# Patient Record
(demographics unavailable — no encounter records)

---

## 2025-05-08 NOTE — HISTORY OF PRESENT ILLNESS
[de-identified] : 34-year-old male presents for an initial evaluation c/o: ear infection Reports he came in because he thinks he has an ear infection in the right ear.  States he has no pain but has ringing, buzzing and static sounds. States it started Monday.  Complains of occasional ear itchiness.  Mentions he was sick late march, ears get clogged on and off since. Realized his ear got clogged last week causing trouble hearing.   No further concerns or complaints.

## 2025-05-08 NOTE — PHYSICAL EXAM
[de-identified] : obstructing cerumen right  [Normal] : mucosa is normal [Midline] : trachea located in midline position